# Patient Record
Sex: MALE | Race: WHITE
[De-identification: names, ages, dates, MRNs, and addresses within clinical notes are randomized per-mention and may not be internally consistent; named-entity substitution may affect disease eponyms.]

---

## 2019-04-06 ENCOUNTER — HOSPITAL ENCOUNTER (INPATIENT)
Dept: HOSPITAL 80 - FED | Age: 54
LOS: 1 days | Discharge: HOME | DRG: 201 | End: 2019-04-07
Attending: SURGERY | Admitting: SURGERY
Payer: COMMERCIAL

## 2019-04-06 DIAGNOSIS — S27.0XXA: Primary | ICD-10-CM
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
## 2019-04-06 DIAGNOSIS — S42.141A: ICD-10-CM

## 2019-04-06 LAB — PLATELET # BLD: 242 10^3/UL (ref 150–400)
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
## 2019-04-06 NOTE — SOAPPROG
Downtime Inpatient MD


Late Entry SOAP Note: 


Colt is resting comfortably, requesting alternative to narcotics


repeat CXR shows the right pneumothorax to be stable, no pleural effusion


rec: oral Flexeril/Tylenol


repeat CXR in AM


monitor in SDU
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
## 2019-04-07 VITALS — DIASTOLIC BLOOD PRESSURE: 74 MMHG | SYSTOLIC BLOOD PRESSURE: 115 MMHG

## 2019-04-07 LAB
PLATELET # BLD: 208 10^3/UL (ref 150–400)
PLATELET # BLD: 210 10^3/UL (ref 150–400)

## 2019-04-07 RX ADMIN — BACITRACIN ZINC SCH: 500 OINTMENT TOPICAL at 00:34

## 2019-04-07 RX ADMIN — CYCLOBENZAPRINE HYDROCHLORIDE SCH: 10 TABLET, FILM COATED ORAL at 09:23

## 2019-04-07 RX ADMIN — BACITRACIN ZINC SCH APP: 500 OINTMENT TOPICAL at 09:24

## 2019-04-07 RX ADMIN — CYCLOBENZAPRINE HYDROCHLORIDE SCH: 10 TABLET, FILM COATED ORAL at 00:57
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
## 2019-04-07 NOTE — ASMTLACE
LACE

 

Length of stay for            Answers:  Less than 1 day                       

current admission                                                             

Acuity / Level of             Answers:  Yes                                   

Care: Did the patient                                                         

have an inpatient                                                             

admission?                                                                    

Comorbidities - select        Answers:  Other                         Notes:  r scapular fx, r 

all that apply                                                                pneumothorax

# of Emergency department     Answers:  1-2                                   

visits in the last 6                                                          

months                                                                        

Score: 5

 

Date Signed:  04/07/2019 11:58 AM

Electronically Signed By:Liana Mayers RN

## 2019-04-07 NOTE — ASDISCHSUM
----------------------------------------------

Discharge Information

----------------------------------------------

Plan Status:Home with No Needs                       Medically Cleared to Leave:04/07/2019

Discharge Date:04/07/2019                            CM D/C Disposition:Home, Routine, Self-Care

ADT D/C Disposition:Home, Routine, Self-Care         Projected Discharge Date:04/07/2019

Transportation at D/C:                               Discharge Delay Reason:

Follow-Up Date:04/07/2019                            Discharge Slot:

Final Diagnosis:

----------------------------------------------

Placement Information

----------------------------------------------

----------------------------------------------

Patient Contact Information

----------------------------------------------

Contact Name:JODY                            Relationship:Wife

Address:[address]                              Home [phone]

                                                     Work [phone]

City:[place]                                         Alternate Phone:

[facility]/Zip Code:[place] [address]                              Email:

----------------------------------------------

Financial Information

----------------------------------------------

Financial Class:BC

[facility]            Primary Plan Number:[id number]

Secondary Plan Desc:                                 Secondary Plan Number:

 

 

----------------------------------------------

Assessment Information

----------------------------------------------

----------------------------------------------

LACE

----------------------------------------------

LACE

 

Length of stay for            Answers:  Less than 1 day                       

current admission                                                             

Acuity / Level of             Answers:  Yes                                   

Care: Did the patient                                                         

have an inpatient                                                             

admission?                                                                    

Comorbidities - select        Answers:  Other                         Notes:  r scapular fx, r 

all that apply                                                                pneumothorax

# of Emergency department     Answers:  1-2                                   

visits in the last 6                                                          

months                                                                        

Score: 5

 

Date Signed:  04/07/2019 11:58 AM

Electronically Signed By:Liana Mayers RN

 

 

----------------------------------------------

[facility] CM Progress Note

----------------------------------------------

CM Note

 

CM Note                       

Notes:

4/7/2019 Case Management Note



Pt admitted for right sided pneumothorax.  OT and SLP cleared pt for safe discharge home without 

services.  Wife is present.  No further case management d/c needs identified.



Case Management d/c poc: independent with follow up as directed.

 

Date Signed:  04/07/2019 12:00 PM

Electronically Signed By:Liana Mayers RN

 

 

----------------------------------------------

Intervention Information

----------------------------------------------
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
## 2019-04-07 NOTE — ASMTCMCOM
CM Note

 

CM Note                       

Notes:

4/7/2019 Case Management Note



Pt admitted for right sided pneumothorax.  OT and SLP cleared pt for safe discharge home without 

services.  Wife is present.  No further case management d/c needs identified.



Case Management d/c poc: independent with follow up as directed.

 

Date Signed:  04/07/2019 12:00 PM

Electronically Signed By:Liana Mayers RN

## 2019-04-08 ENCOUNTER — HOSPITAL ENCOUNTER (EMERGENCY)
Dept: HOSPITAL 80 - FED | Age: 54
Discharge: HOME | End: 2019-04-08
Payer: COMMERCIAL

## 2019-04-08 VITALS — DIASTOLIC BLOOD PRESSURE: 81 MMHG | SYSTOLIC BLOOD PRESSURE: 140 MMHG

## 2019-04-08 DIAGNOSIS — S42.101D: ICD-10-CM

## 2019-04-08 DIAGNOSIS — S27.0XXA: Primary | ICD-10-CM
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
## 2019-04-08 DIAGNOSIS — J45.909: ICD-10-CM

## 2019-04-08 PROCEDURE — 0W9930Z DRAINAGE OF RIGHT PLEURAL CAVITY WITH DRAINAGE DEVICE, PERCUTANEOUS APPROACH: ICD-10-PCS | Performed by: SURGERY

## 2019-04-08 NOTE — GOP
[f rep st]



                                                                OPERATIVE REPORT





DATE OF OPERATION:  04/08/2019



SURGEON:  John Strong MD



ASSISTANT:  None.



ANESTHESIA:  1% local.



PREOPERATIVE DIAGNOSIS:  Worsening right-sided pneumothorax.



POSTOPERATIVE DIAGNOSIS:  Worsening right-sided pneumothorax.



PROCEDURE PERFORMED:  Right chest tube thoracostomy placement.



FINDINGS:  



SPECIMENS:  None.



ESTIMATED BLOOD LOSS:  2 mL.



DESCRIPTION OF PROCEDURE:  The patient was greeted in the trauma bay.  Risks, benefits and alternativ
es were discussed.  Consent was signed.  [employer] time-out was performed.  His right
 chest was prepped and draped in typical sterile fashion.  In the 5th axillary intercostal space, I a
nesthetized the area after it was successfully prepped and draped.  Through this, I inserted a needle
 thoracostomy tube successfully into the chest and advanced the tube catheter.  Got good air escape u
estiven placing it.  It was attached to the skin with an interrupted silk suture.  It was attached to a H
eimlich valve.  A postplacement chest film showed near resolution of the pneumothorax.  The patient t
olerated the procedure well without any complications.



DRAINS:  Eighteen[place] chest tube to the right chest.





Job #:  [id number]
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
## 2019-04-10 ENCOUNTER — HOSPITAL ENCOUNTER (OUTPATIENT)
Dept: HOSPITAL 80 - FIMAGING | Age: 54
End: 2019-04-10
Attending: PHYSICIAN ASSISTANT
Payer: COMMERCIAL

## 2019-04-10 DIAGNOSIS — Z09: Primary | ICD-10-CM

## 2019-04-10 DIAGNOSIS — Z97.8: ICD-10-CM

## 2019-04-10 DIAGNOSIS — J93.9: ICD-10-CM

## 2019-04-11 ENCOUNTER — HOSPITAL ENCOUNTER (OUTPATIENT)
Dept: HOSPITAL 80 - FIMAGING | Age: 54
End: 2019-04-11
Attending: SURGERY
Payer: COMMERCIAL

## 2019-04-11 DIAGNOSIS — Z97.8: ICD-10-CM

## 2019-04-11 DIAGNOSIS — S27.0XXA: Primary | ICD-10-CM

## 2019-04-11 DIAGNOSIS — J93.9: Primary | ICD-10-CM

## 2019-04-15 ENCOUNTER — HOSPITAL ENCOUNTER (OUTPATIENT)
Dept: HOSPITAL 80 - FIMAGING | Age: 54
End: 2019-04-15
Attending: PHYSICIAN ASSISTANT
Payer: COMMERCIAL

## 2019-04-15 DIAGNOSIS — S22.31XA: ICD-10-CM

## 2019-04-15 DIAGNOSIS — Z00.00: Primary | ICD-10-CM
